# Patient Record
Sex: FEMALE | Race: BLACK OR AFRICAN AMERICAN | Employment: UNEMPLOYED | URBAN - METROPOLITAN AREA
[De-identification: names, ages, dates, MRNs, and addresses within clinical notes are randomized per-mention and may not be internally consistent; named-entity substitution may affect disease eponyms.]

---

## 2017-01-03 ENCOUNTER — OFFICE VISIT (OUTPATIENT)
Dept: PHYSICAL THERAPY | Age: 57
End: 2017-01-03
Attending: ORTHOPAEDIC SURGERY
Payer: MEDICAID

## 2017-01-03 PROCEDURE — 97110 THERAPEUTIC EXERCISES: CPT

## 2017-01-03 NOTE — PROGRESS NOTES
Authorized # of Visits:  3         Next MD visit: none scheduled  Fall Risk: standard         Precautions: n/a           Medication Changes since last visit?: no   Subjective: patient reports that she she has less pain 3/10 in her left groin since last v

## 2017-01-05 ENCOUNTER — OFFICE VISIT (OUTPATIENT)
Dept: PHYSICAL THERAPY | Age: 57
End: 2017-01-05
Attending: ORTHOPAEDIC SURGERY
Payer: MEDICAID

## 2017-01-05 PROCEDURE — 97110 THERAPEUTIC EXERCISES: CPT

## 2017-01-05 NOTE — PROGRESS NOTES
Authorized # of Visits:  4         Next MD visit: none scheduled  Fall Risk: standard         Precautions: n/a           Medication Changes since last visit?: no   Subjective: Pt reports back feels about the same.  States back was at a 4/10, but since she

## 2017-01-09 ENCOUNTER — OFFICE VISIT (OUTPATIENT)
Dept: PHYSICAL THERAPY | Age: 57
End: 2017-01-09
Attending: ORTHOPAEDIC SURGERY
Payer: MEDICAID

## 2017-01-09 PROCEDURE — 97140 MANUAL THERAPY 1/> REGIONS: CPT

## 2017-01-09 PROCEDURE — 97110 THERAPEUTIC EXERCISES: CPT

## 2017-01-09 NOTE — PROGRESS NOTES
Authorized # of Visits:  5         Next MD visit: none scheduled  Fall Risk: standard         Precautions: n/a           Medication Changes since last visit?: no   Subjective: Pt 15mins late for appt. States neck pain at 6/10; low back at 0/10.  Back has

## 2017-01-16 ENCOUNTER — TELEPHONE (OUTPATIENT)
Dept: FAMILY MEDICINE CLINIC | Facility: CLINIC | Age: 57
End: 2017-01-16

## 2017-01-16 ENCOUNTER — OFFICE VISIT (OUTPATIENT)
Dept: ORTHOPEDICS CLINIC | Facility: CLINIC | Age: 57
End: 2017-01-16

## 2017-01-16 DIAGNOSIS — R10.32 GROIN PAIN, LEFT: ICD-10-CM

## 2017-01-16 DIAGNOSIS — M54.32 SCIATICA OF LEFT SIDE: Primary | ICD-10-CM

## 2017-01-16 PROCEDURE — 99212 OFFICE O/P EST SF 10 MIN: CPT | Performed by: ORTHOPAEDIC SURGERY

## 2017-01-16 PROCEDURE — 99213 OFFICE O/P EST LOW 20 MIN: CPT | Performed by: ORTHOPAEDIC SURGERY

## 2017-01-16 NOTE — PROGRESS NOTES
Patient is a 54-year-old female who presents with a long-standing history of low back pain that goes to her buttock and now radiates down her leg below the knee with achiness as well as a tingling. She denies weakness.   In addition to this chronic low chaim elevated BMI she is does not walk with any antalgic gait is a smooth rhythmic walk.   Low back range of motion will cause some buttock complaints of low back achiness but is full straight leg raising will cause some left buttock complaints on the left not o

## 2017-01-16 NOTE — TELEPHONE ENCOUNTER
Pt was referred to see the Ortho Dr. Joseph Lezama has advised pt to have MD prescribe her an anti inflammatory medication   Pt did give reason as to why she need to have this med prescribed   Please advise

## 2017-01-16 NOTE — TELEPHONE ENCOUNTER
Dr. Cronin  feels pt has arthritis and bursitis, he feels pt needs anti inflammatory or steroid but due to her allergy to ibuprofen and allergy history to meds (per pt no rash to ibuprofen but she does have abd pain, stomach burning, abd swelling;  Please n

## 2017-01-17 RX ORDER — TRAMADOL HYDROCHLORIDE 50 MG/1
TABLET ORAL
Qty: 90 TABLET | Refills: 0 | Status: CANCELLED | OUTPATIENT
Start: 2017-01-17

## 2017-01-17 NOTE — TELEPHONE ENCOUNTER
Patient contacted; I did give her DR German Gómez advised to continue Tramadol. She agreed with advise. DR German Gómez: She asked if you can approve another refill for Tramadol. Her last RX given 12/23/16.

## 2017-01-19 ENCOUNTER — TELEPHONE (OUTPATIENT)
Dept: ORTHOPEDICS CLINIC | Facility: CLINIC | Age: 57
End: 2017-01-19

## 2017-01-19 NOTE — TELEPHONE ENCOUNTER
Called [facility] and s/w to initiate PA for MRI lumbar spine w/o con. Gave clinicals per DP OV notes. MRI in clinical review. Tracking #[id number].  Clinicals were faxed to [facility]

## 2017-01-21 RX ORDER — TRAMADOL HYDROCHLORIDE 50 MG/1
TABLET ORAL
Qty: 90 TABLET | Refills: 0 | Status: SHIPPED | OUTPATIENT
Start: 2017-01-21 | End: 2017-02-19

## 2017-01-23 NOTE — TELEPHONE ENCOUNTER
aron'd call from Robert Arizmendi that MRI was denied and needs a peer to peer .  Will CB on 1/25 w/ DP

## 2017-01-25 NOTE — TELEPHONE ENCOUNTER
Called [facility] and correct track# [id number]- initiated peer to peer and call transferred to [facility], MRI approved auth # [id number] exp 2/24/17 for molecular imaging in [place]. Order faxed. Called pt and informed her of MRI auth.  Pt informed that authorization is

## 2017-01-26 ENCOUNTER — OFFICE VISIT (OUTPATIENT)
Dept: PHYSICAL THERAPY | Age: 57
End: 2017-01-26
Attending: ORTHOPAEDIC SURGERY
Payer: MEDICAID

## 2017-01-26 PROCEDURE — 97110 THERAPEUTIC EXERCISES: CPT

## 2017-01-26 NOTE — PROGRESS NOTES
Authorized # of Visits:  6         Next MD visit: none scheduled  Fall Risk: standard         Precautions: n/a           Medication Changes since last visit?: no   Subjective: Pt reports that her lower back feels good today and reports pain 0/10.  She sta

## 2017-01-27 ENCOUNTER — TELEPHONE (OUTPATIENT)
Dept: ORTHOPEDICS CLINIC | Facility: CLINIC | Age: 57
End: 2017-01-27

## 2017-01-27 NOTE — TELEPHONE ENCOUNTER
Imaging Center received order for MRI of Lumbar Spine yesterday, but pt's facesheet was not sent. No contact info sent. Please fax info to fax # [phone].

## 2017-01-28 ENCOUNTER — TELEPHONE (OUTPATIENT)
Dept: FAMILY MEDICINE CLINIC | Facility: CLINIC | Age: 57
End: 2017-01-28

## 2017-01-28 NOTE — TELEPHONE ENCOUNTER
Spoke with pt regarding labs. Informed her they were in the system. Verbalized understanding and stated would stop by at the lab at her convenience.

## 2017-01-28 NOTE — TELEPHONE ENCOUNTER
Pt received a VM from the Nurse on Thursday advising she need to be seen   Pt is requesting to have her lab orders added   Please advise

## 2017-01-30 ENCOUNTER — OFFICE VISIT (OUTPATIENT)
Dept: PHYSICAL THERAPY | Age: 57
End: 2017-01-30
Attending: ORTHOPAEDIC SURGERY
Payer: MEDICAID

## 2017-01-30 PROCEDURE — 97110 THERAPEUTIC EXERCISES: CPT

## 2017-01-30 NOTE — PROGRESS NOTES
Patient Name: Cathi Mejia, : 1960, MRN: [id number]   Date:  2017  Referring Physician:  Nimisha Ribeiro    Diagnosis: Neck strain, initial encounter (S16.1XXA)  Scoliosis of lumbosacral spine, unspecified scoliosis type (M41.9)  Groin pa restrict  -    Retraction  no restrict  +        Lumbar AROM:    Pain (+/-)    Flexion  no restrict  -    Extension  Slight-min restrict  -   R Sidebend  no restrict  -    L Sidebend  min restrict  +   R Rotation  no restrict  -    L Rotation  no restrict  therapeutic activity, neuro re-ed, posture training, patient education, HEP       Patient was advised of these findings, precautions, and treatment options and has agreed to actively participate in planning and for this course of care.     Thank you for you

## 2017-02-11 ENCOUNTER — APPOINTMENT (OUTPATIENT)
Dept: LAB | Age: 57
End: 2017-02-11
Attending: FAMILY MEDICINE
Payer: MEDICAID

## 2017-02-11 DIAGNOSIS — E11.69 DIABETES MELLITUS TYPE 2 IN OBESE (HCC): ICD-10-CM

## 2017-02-11 DIAGNOSIS — M25.552 PAIN OF LEFT HIP JOINT: ICD-10-CM

## 2017-02-11 DIAGNOSIS — E66.9 DIABETES MELLITUS TYPE 2 IN OBESE (HCC): ICD-10-CM

## 2017-02-11 LAB
ALT SERPL-CCNC: 15 U/L (ref 14–54)
ANION GAP SERPL CALC-SCNC: 7 MMOL/L (ref 0–18)
AST SERPL-CCNC: 17 U/L (ref 15–41)
BUN SERPL-MCNC: 11 MG/DL (ref 8–20)
BUN/CREAT SERPL: 14.9 (ref 10–20)
CALCIUM SERPL-MCNC: 9.4 MG/DL (ref 8.5–10.5)
CHLORIDE SERPL-SCNC: 102 MMOL/L (ref 95–110)
CHOLEST SERPL-MCNC: 201 MG/DL (ref 110–200)
CO2 SERPL-SCNC: 28 MMOL/L (ref 22–32)
CREAT SERPL-MCNC: 0.74 MG/DL (ref 0.5–1.5)
CREAT UR-MCNC: 66 MG/DL
GLUCOSE SERPL-MCNC: 116 MG/DL (ref 70–99)
HBA1C MFR BLD: 8 % (ref 4–6)
HDLC SERPL-MCNC: 58 MG/DL
LDLC SERPL CALC-MCNC: 119 MG/DL (ref 0–99)
MICROALBUMIN UR-MCNC: 0.2 MG/DL (ref 0–1.8)
MICROALBUMIN/CREAT UR: 3 MG/G{CREAT} (ref 0–20)
NONHDLC SERPL-MCNC: 143 MG/DL
OSMOLALITY UR CALC.SUM OF ELEC: 284 MOSM/KG (ref 275–295)
POTASSIUM SERPL-SCNC: 3.9 MMOL/L (ref 3.3–5.1)
SODIUM SERPL-SCNC: 137 MMOL/L (ref 136–144)
TRIGL SERPL-MCNC: 122 MG/DL (ref 1–149)

## 2017-02-11 PROCEDURE — 82570 ASSAY OF URINE CREATININE: CPT

## 2017-02-11 PROCEDURE — 83036 HEMOGLOBIN GLYCOSYLATED A1C: CPT

## 2017-02-11 PROCEDURE — 80061 LIPID PANEL: CPT

## 2017-02-11 PROCEDURE — 36415 COLL VENOUS BLD VENIPUNCTURE: CPT

## 2017-02-11 PROCEDURE — 80048 BASIC METABOLIC PNL TOTAL CA: CPT

## 2017-02-11 PROCEDURE — 82043 UR ALBUMIN QUANTITATIVE: CPT

## 2017-02-11 PROCEDURE — 84450 TRANSFERASE (AST) (SGOT): CPT

## 2017-02-11 PROCEDURE — 84460 ALANINE AMINO (ALT) (SGPT): CPT

## 2017-02-16 ENCOUNTER — TELEPHONE (OUTPATIENT)
Dept: ORTHOPEDICS CLINIC | Facility: CLINIC | Age: 57
End: 2017-02-16

## 2017-02-16 ENCOUNTER — APPOINTMENT (OUTPATIENT)
Dept: PHYSICAL THERAPY | Age: 57
End: 2017-02-16
Attending: ORTHOPAEDIC SURGERY
Payer: MEDICAID

## 2017-02-16 NOTE — TELEPHONE ENCOUNTER
LMTCB on pt's home # with      -- Please double book pt appt with Marco for MRI results on 02/22/17 when pt calls back. Thank you!

## 2017-02-16 NOTE — TELEPHONE ENCOUNTER
Pt dropped off CD for MRI LSP w/o from [facility]. Pt was referred by Dr Leticia Krabbe to see Dr Jenny Pemberton. Pt stts moving out of town on Thursday, February 23, 2016 and would like to see Dr Jenny Pemberton on Wednesday, Feb 22, but no appointment available.  Place

## 2017-02-20 RX ORDER — TRAMADOL HYDROCHLORIDE 50 MG/1
TABLET ORAL
Qty: 90 TABLET | Refills: 0 | Status: SHIPPED | OUTPATIENT
Start: 2017-02-20 | End: 2017-03-10

## 2017-02-21 ENCOUNTER — OFFICE VISIT (OUTPATIENT)
Dept: FAMILY MEDICINE CLINIC | Facility: CLINIC | Age: 57
End: 2017-02-21

## 2017-02-21 VITALS
WEIGHT: 221 LBS | SYSTOLIC BLOOD PRESSURE: 130 MMHG | HEART RATE: 85 BPM | DIASTOLIC BLOOD PRESSURE: 80 MMHG | BODY MASS INDEX: 41.72 KG/M2 | HEIGHT: 61 IN

## 2017-02-21 DIAGNOSIS — R06.00 DOE (DYSPNEA ON EXERTION): ICD-10-CM

## 2017-02-21 DIAGNOSIS — E11.9 TYPE 2 DIABETES MELLITUS WITHOUT COMPLICATION, WITHOUT LONG-TERM CURRENT USE OF INSULIN (HCC): Primary | ICD-10-CM

## 2017-02-21 PROCEDURE — 99214 OFFICE O/P EST MOD 30 MIN: CPT | Performed by: FAMILY MEDICINE

## 2017-02-21 PROCEDURE — 99212 OFFICE O/P EST SF 10 MIN: CPT | Performed by: FAMILY MEDICINE

## 2017-02-21 NOTE — PROGRESS NOTES
Blood pressure 130/80, pulse 85, height 5' 1\" (1.549 m), weight 221 lb (100.245 kg), unknown if currently breastfeeding. PATIENT PRESENTS TODAY FOLLOWING UP FOR DM TAKING ONLY 2 TABS METFORMIN DAILY.   HAS DYSPNEA GOING UP STAIRS SHE IS MOVING TO [place] IN

## 2017-02-23 ENCOUNTER — TELEPHONE (OUTPATIENT)
Dept: PHYSICAL THERAPY | Age: 57
End: 2017-02-23

## 2017-02-23 NOTE — TELEPHONE ENCOUNTER
Spoke with patient's  regarding patient's missed visits.  Per pt's  pt is moving out of state and will not be returning for PT

## 2017-02-24 ENCOUNTER — TELEPHONE (OUTPATIENT)
Dept: INTERNAL MEDICINE CLINIC | Facility: CLINIC | Age: 57
End: 2017-02-24

## 2017-02-24 NOTE — TELEPHONE ENCOUNTER
Pt seen in office on 2/21/17 for results follow up. No phone call needed to contact pt.          ----- Message from Darcy Kelly DO sent at 2/13/2017  7:25 PM CST -----  HGBA1C ELEVATED PATIENT NEEDS APPOINTMENT TO BE SEEN.     Ref Range 2/11/17 12:19

## 2017-03-02 ENCOUNTER — APPOINTMENT (OUTPATIENT)
Dept: PHYSICAL THERAPY | Age: 57
End: 2017-03-02
Attending: ORTHOPAEDIC SURGERY
Payer: MEDICAID

## 2017-03-09 NOTE — TELEPHONE ENCOUNTER
Pt calling back also requesting refill Tramadol   Advised 48-72 hr turnaround   [facility] almost out of metformin

## 2017-03-09 NOTE — TELEPHONE ENCOUNTER
Patient's spouse calling on behalf of patient. Recently moved to [place] and needs refills on scripts.      Current Outpatient Prescriptions:  Albuterol Sulfate HFA (VENTOLIN HFA) 108 (90 BASE) MCG/ACT Inhalation Aero Soln Inhale 2 puffs into the lungs every

## 2017-03-10 RX ORDER — ALBUTEROL SULFATE 90 UG/1
2 AEROSOL, METERED RESPIRATORY (INHALATION) EVERY 4 HOURS PRN
Qty: 1 INHALER | Refills: 2 | Status: SHIPPED | OUTPATIENT
Start: 2017-03-10

## 2017-03-10 RX ORDER — TRAMADOL HYDROCHLORIDE 50 MG/1
TABLET ORAL
Qty: 90 TABLET | Refills: 0 | Status: SHIPPED | OUTPATIENT
Start: 2017-03-10 | End: 2017-03-20

## 2017-03-10 NOTE — TELEPHONE ENCOUNTER
Please advise regarding refill medication request.     Diabetes Medications  Protocol Criteria:  · Appointment scheduled in the past 6 months or the next 3 months  · A1C < 7.5 in the past 6 months  · Creatinine in the past 12 months  · Creatinine result <

## 2017-03-20 ENCOUNTER — TELEPHONE (OUTPATIENT)
Dept: FAMILY MEDICINE CLINIC | Facility: CLINIC | Age: 57
End: 2017-03-20

## 2017-03-20 RX ORDER — TRAMADOL HYDROCHLORIDE 50 MG/1
TABLET ORAL
Qty: 90 TABLET | Refills: 0 | Status: SHIPPED | OUTPATIENT
Start: 2017-03-20

## 2017-03-20 NOTE — TELEPHONE ENCOUNTER
RX was called into [place], [place] (pharmacy listed on 3/10 script) on 3/16. Megan at [facility] in [place] states they did not receive voicemail and they do not have a script on file. Contacted [facility] [place], [place].  Damian Nichols, pharmacist conformed they did not

## 2017-03-20 NOTE — TELEPHONE ENCOUNTER
Per pt, she would like her Tramadol, Gabapentin send to [facility] [facility] NOT is [facility]. Pt is out of med.

## 2017-03-20 NOTE — TELEPHONE ENCOUNTER
Per pt, [facility] on file received her rx med except Tramadol,  Pt is waiting for the Tramadol,  Pt is out of med. Pls advise.

## 2017-03-21 NOTE — TELEPHONE ENCOUNTER
Contacted Christine, [facility], they have received the albuterol and metformin rx. They cannot transfer narcotic prescriptions between pharmacy.  Tramadol must be called in or hard copy, no fax

## 2017-03-21 NOTE — TELEPHONE ENCOUNTER
Pt called to follow up. Informed pt of Remington Lowry (RN) message below. Pt stts she did call pharmacy to transfer but pt stts she was informed by pharmacy to call PCP have Dr. Marie Solares scripts. Please advise.

## 2017-03-27 NOTE — TELEPHONE ENCOUNTER
[address] calling states pt would like to  med at pharmacy in in [facility] please transfer, pharmacy states they are unable to do so for this meds.

## 2017-03-28 NOTE — TELEPHONE ENCOUNTER
Calling [facility] in [place]. Pharmacy tech confirmed that [facility], pharmacist did call yesterday for verbal on tramadol. No Tramadol on file was previously called in.      I was then transferred to leave detailed message on [employer] dedicated voicemail

## 2017-04-12 ENCOUNTER — TELEPHONE (OUTPATIENT)
Dept: FAMILY MEDICINE CLINIC | Facility: CLINIC | Age: 57
End: 2017-04-12

## 2017-04-12 NOTE — TELEPHONE ENCOUNTER
Pt calling back requesting if possible to call in again following medications to her [facility]. Rx Metformin, Tramadol, gabapentin, and inhaler albuterol, [address] [phone], [phone].

## 2017-04-12 NOTE — TELEPHONE ENCOUNTER
Pt calling back requesting if possible to call in again following medications to her [facility]. Rx Metformin, Tramadol, gabapentin, and inhaler albuterol, [address] [phone], [phone].

## 2017-04-13 RX ORDER — GABAPENTIN 300 MG/1
300 CAPSULE ORAL NIGHTLY
Qty: 30 CAPSULE | Refills: 3 | Status: SHIPPED | OUTPATIENT
Start: 2017-04-13

## 2017-04-13 NOTE — TELEPHONE ENCOUNTER
Informed pt to establish care in [place]. Pharmacy contacted. Verbalized pt  rx in [place]. Pt verbalized pharmacy is correct. No further questions at present time.       Refill Protocol Appointment Criteria  · Appointment scheduled in the past 6 months

## 2017-05-30 ENCOUNTER — TELEPHONE (OUTPATIENT)
Dept: FAMILY MEDICINE CLINIC | Facility: CLINIC | Age: 57
End: 2017-05-30

## 2017-05-31 NOTE — TELEPHONE ENCOUNTER
Actions Requested: Wants a prescription for Tramadol  Situation/Background   Problem: still has pain in her back even after surgery. Lives in [place] with no benefits at the present time.  will be getting insurance shortly.   Was seen in ED and given emergency to the triager  * Severe abdominal pain (e.g., excruciating)  * Abdominal pain and age > 61  * Unable to urinate (or only a few drops) and bladder feels very full  * Numbness (loss of sensation) in groin or rectal area  * Sudden onset of severe b

## 2017-05-31 NOTE — TELEPHONE ENCOUNTER
Pt advised of rx refil approval, she will have it transferred from [facility], pharmacy to pharm in [place] or have family get it to her. Pt advised if sxs persist, increase, change, or worsen to proceed to the ER.   Pt verbalized understanding and agrees wit

## 2017-06-01 RX ORDER — TRAMADOL HYDROCHLORIDE 50 MG/1
TABLET ORAL
Qty: 30 TABLET | Refills: 0 | OUTPATIENT
Start: 2017-06-01

## 2021-03-18 DIAGNOSIS — Z23 NEED FOR VACCINATION: ICD-10-CM

## (undated) NOTE — MR AVS SNAPSHOT
[address] [facility]  [phone]               Thank you for choosing us for your health care visit with Franklin Vicente MD.  We are glad to serve you and happy to provide you with this summary Inhale 2 puffs into the lungs every 4 to 6 hours as needed for Wheezing. Commonly known as:  PROAIR HFA           * Albuterol Sulfate  (90 BASE) MCG/ACT Aers   Inhale 2 puffs into the lungs every 4 (four) hours as needed for Wheezing.    Commonly k medications prescribed for you. Read the directions carefully, and ask your doctor or other care provider to review them with you. Follow-up Instructions     Return for Dr Grisel Mcbride to see.          Health Goals discussed Today        Last [place] [facility]

## (undated) NOTE — MR AVS SNAPSHOT
[facility]  [address] [place] [address], [address] [place] [address] Fran               Thank you for choosing us for your health care visit with Chantell Villaseñor DO.   We are glad to serve you and happy to provide you with this summary long-term current use of insulin ([address] [E11.9]           EKG 12 Lead to be performed at [facility]    Complete by:  As directed    Assoc Dx:  Type 2 diabetes mellitus without complication, without long-term current use of insulin ([address] [E11 use of insulin    -  Primary    MALDONADO (dyspnea on exertion)          Instructions and Information about Your Health     None      Allergies as of Feb 21, 2017     Beta Adrenergic Blockers Anaphylaxis    Ibuprofen     Other reaction(s): Stomach pain    Lisino TAKE ONE TABLET BY MOUTH EVERY 6 HOURS AS NEEDED FOR PAIN   Commonly known as:  ULTRAM           * Notice: This list has 2 medication(s) that are the same as other medications prescribed for you.  Read the directions carefully, and ask your doctor or other

## (undated) NOTE — MR AVS SNAPSHOT
[address] [facility] [phone]               Thank you for choosing us for your health care visit with Jackie Herrera PTA.   We are glad to serve you and happy to provide you with this summ Summaries. If you've been to the Emergency Department or your doctor's office, you can view your past visit information in [employer] by going to Visits < Visit Summaries. [employer] questions? Call [phone] for help.   [employer] is NOT to be used for urge

## (undated) NOTE — MR AVS SNAPSHOT
[address] Located [facility] [phone]               Thank you for choosing us for your health care visit with Drea Cunningham PTA.   We are glad to serve you and happy to provide you with this summ Inhale 2 puffs into the lungs every 4 to 6 hours as needed for Wheezing. Commonly known as:  PROAIR HFA           * Albuterol Sulfate  (90 BASE) MCG/ACT Aers   Inhale 2 puffs into the lungs every 4 (four) hours as needed for Wheezing.    Commonly k medications prescribed for you. Read the directions carefully, and ask your doctor or other care provider to review them with you.             Health Goals discussed Today        Last Edited       Therapy Goals 12/15/2016  6:12 PM by Gael Chang,

## (undated) NOTE — MR AVS SNAPSHOT
[address] [facility] [address]  [phone]  [phone]               Thank you for choosing us for your health care visit with Zenia Chang PT.   We are glad to serve you and happy to provide you with this s Inhale 2 puffs into the lungs every 4 to 6 hours as needed for Wheezing. Commonly known as:  PROAIR HFA           * Albuterol Sulfate  (90 BASE) MCG/ACT Aers   Inhale 2 puffs into the lungs every 4 (four) hours as needed for Wheezing.    Commonly k medications prescribed for you. Read the directions carefully, and ask your doctor or other care provider to review them with you.             Health Goals discussed Today        Last Edited       Therapy Goals 12/15/2016  6:12 PM by Chantel Munoz,

## (undated) NOTE — MR AVS SNAPSHOT
[address] [facility] [address]  [phone]  [phone]               Thank you for choosing us for your health care visit with Raymond Licona PT.   We are glad to serve you and happy to provide you with this s Take 1 capsule (200 mg total) by mouth 2 (two) times daily. Commonly known as:  CELEBREX           COMBIVENT RESPIMAT  MCG/ACT Aers   Generic drug:  Ipratropium-Albuterol   Inhale  into the lungs.  inhale 1 puff by inhalation route 4 times every day 75% of the time to allow for independent management of symptoms  2. In 3 weeks, pt will demo normalized sacroiliac alignment to promote proper muscle activation of pelvic musculature  3.  In 4 weeks, pt will demo improved postural awareness in sitting and s

## (undated) NOTE — MR AVS SNAPSHOT
[address] [facility] [address]  [phone]               Thank you for choosing us for your health care visit with Monserrat Frias PT.   We are glad to serve you and happy to provide you with this s